# Patient Record
Sex: MALE | Race: OTHER | Employment: FULL TIME | URBAN - METROPOLITAN AREA
[De-identification: names, ages, dates, MRNs, and addresses within clinical notes are randomized per-mention and may not be internally consistent; named-entity substitution may affect disease eponyms.]

---

## 2021-01-17 ENCOUNTER — HOSPITAL ENCOUNTER (EMERGENCY)
Age: 56
Discharge: HOME | End: 2021-01-17
Attending: EMERGENCY MEDICINE
Payer: MEDICARE

## 2021-01-17 ENCOUNTER — APPOINTMENT (OUTPATIENT)
Dept: GENERAL RADIOLOGY | Age: 56
End: 2021-01-17
Payer: MEDICARE

## 2021-01-17 VITALS
RESPIRATION RATE: 16 BRPM | OXYGEN SATURATION: 98 % | SYSTOLIC BLOOD PRESSURE: 172 MMHG | HEART RATE: 67 BPM | TEMPERATURE: 97.5 F | DIASTOLIC BLOOD PRESSURE: 98 MMHG

## 2021-01-17 DIAGNOSIS — S62.619A CLOSED FRACTURE OF PROXIMAL PHALANX OF DIGIT OF LEFT HAND, INITIAL ENCOUNTER: Primary | ICD-10-CM

## 2021-01-17 PROCEDURE — 29125 APPL SHORT ARM SPLINT STATIC: CPT

## 2021-01-17 PROCEDURE — 99283 EMERGENCY DEPT VISIT LOW MDM: CPT

## 2021-01-17 PROCEDURE — 73130 X-RAY EXAM OF HAND: CPT

## 2021-01-17 RX ORDER — HYDROCODONE BITARTRATE AND ACETAMINOPHEN 5; 325 MG/1; MG/1
1 TABLET ORAL EVERY 6 HOURS PRN
Qty: 10 TABLET | Refills: 0 | Status: SHIPPED | OUTPATIENT
Start: 2021-01-17 | End: 2021-01-20

## 2021-01-17 SDOH — HEALTH STABILITY: MENTAL HEALTH: HOW OFTEN DO YOU HAVE A DRINK CONTAINING ALCOHOL?: NEVER

## 2021-01-17 ASSESSMENT — PAIN DESCRIPTION - ORIENTATION: ORIENTATION: LEFT

## 2021-01-17 ASSESSMENT — PAIN DESCRIPTION - DESCRIPTORS: DESCRIPTORS: THROBBING

## 2021-01-17 ASSESSMENT — PAIN SCALES - GENERAL: PAINLEVEL_OUTOF10: 9
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
## 2021-04-06 ENCOUNTER — HOSPITAL ENCOUNTER (EMERGENCY)
Age: 56
Discharge: HOME | End: 2021-04-06
Attending: EMERGENCY MEDICINE
Payer: MEDICARE

## 2021-04-06 ENCOUNTER — APPOINTMENT (OUTPATIENT)
Dept: GENERAL RADIOLOGY | Age: 56
End: 2021-04-06
Payer: MEDICARE

## 2021-04-06 ENCOUNTER — APPOINTMENT (OUTPATIENT)
Dept: CT IMAGING | Age: 56
End: 2021-04-06
Payer: MEDICARE

## 2021-04-06 VITALS
RESPIRATION RATE: 18 BRPM | OXYGEN SATURATION: 98 % | SYSTOLIC BLOOD PRESSURE: 134 MMHG | TEMPERATURE: 99.1 F | DIASTOLIC BLOOD PRESSURE: 78 MMHG | BODY MASS INDEX: 26.66 KG/M2 | WEIGHT: 180 LBS | HEIGHT: 69 IN | HEART RATE: 60 BPM

## 2021-04-06 DIAGNOSIS — R07.9 CHEST PAIN, UNSPECIFIED TYPE: Primary | ICD-10-CM

## 2021-04-06 LAB
ANION GAP SERPL CALCULATED.3IONS-SCNC: 7 MMOL/L (ref 3–16)
BASOPHILS ABSOLUTE: 0 K/UL (ref 0–0.2)
BASOPHILS RELATIVE PERCENT: 0.6 %
BUN BLDV-MCNC: 10 MG/DL (ref 7–20)
CALCIUM SERPL-MCNC: 9.3 MG/DL (ref 8.3–10.6)
CHLORIDE BLD-SCNC: 101 MMOL/L (ref 99–110)
CO2: 26 MMOL/L (ref 21–32)
CREAT SERPL-MCNC: 0.9 MG/DL (ref 0.9–1.3)
EKG ATRIAL RATE: 66 BPM
EKG DIAGNOSIS: NORMAL
EKG P AXIS: 50 DEGREES
EKG P-R INTERVAL: 138 MS
EKG Q-T INTERVAL: 400 MS
EKG QRS DURATION: 88 MS
EKG QTC CALCULATION (BAZETT): 419 MS
EKG R AXIS: 29 DEGREES
EKG T AXIS: 64 DEGREES
EKG VENTRICULAR RATE: 66 BPM
EOSINOPHILS ABSOLUTE: 0.1 K/UL (ref 0–0.6)
EOSINOPHILS RELATIVE PERCENT: 1.7 %
GFR AFRICAN AMERICAN: >60
GFR NON-AFRICAN AMERICAN: >60
GLUCOSE BLD-MCNC: 268 MG/DL (ref 70–99)
HCT VFR BLD CALC: 42.4 % (ref 40.5–52.5)
HEMOGLOBIN: 13.3 G/DL (ref 13.5–17.5)
LYMPHOCYTES ABSOLUTE: 2.5 K/UL (ref 1–5.1)
LYMPHOCYTES RELATIVE PERCENT: 40.7 %
MCH RBC QN AUTO: 22 PG (ref 26–34)
MCHC RBC AUTO-ENTMCNC: 31.4 G/DL (ref 31–36)
MCV RBC AUTO: 70.1 FL (ref 80–100)
MONOCYTES ABSOLUTE: 0.5 K/UL (ref 0–1.3)
MONOCYTES RELATIVE PERCENT: 8.4 %
NEUTROPHILS ABSOLUTE: 3 K/UL (ref 1.7–7.7)
NEUTROPHILS RELATIVE PERCENT: 48.6 %
PDW BLD-RTO: 14.7 % (ref 12.4–15.4)
PLATELET # BLD: 211 K/UL (ref 135–450)
PMV BLD AUTO: 9.4 FL (ref 5–10.5)
POTASSIUM REFLEX MAGNESIUM: 4.3 MMOL/L (ref 3.5–5.1)
PRO-BNP: 50 PG/ML (ref 0–124)
RBC # BLD: 6.05 M/UL (ref 4.2–5.9)
SODIUM BLD-SCNC: 134 MMOL/L (ref 136–145)
TROPONIN: <0.01 NG/ML
TROPONIN: <0.01 NG/ML
WBC # BLD: 6.2 K/UL (ref 4–11)

## 2021-04-06 PROCEDURE — 71275 CT ANGIOGRAPHY CHEST: CPT

## 2021-04-06 PROCEDURE — 6360000004 HC RX CONTRAST MEDICATION: Performed by: EMERGENCY MEDICINE

## 2021-04-06 PROCEDURE — 99283 EMERGENCY DEPT VISIT LOW MDM: CPT

## 2021-04-06 PROCEDURE — 83880 ASSAY OF NATRIURETIC PEPTIDE: CPT

## 2021-04-06 PROCEDURE — 80048 BASIC METABOLIC PNL TOTAL CA: CPT

## 2021-04-06 PROCEDURE — 71046 X-RAY EXAM CHEST 2 VIEWS: CPT

## 2021-04-06 PROCEDURE — 6360000002 HC RX W HCPCS: Performed by: EMERGENCY MEDICINE

## 2021-04-06 PROCEDURE — 93005 ELECTROCARDIOGRAM TRACING: CPT | Performed by: EMERGENCY MEDICINE

## 2021-04-06 PROCEDURE — 85025 COMPLETE CBC W/AUTO DIFF WBC: CPT

## 2021-04-06 PROCEDURE — 84484 ASSAY OF TROPONIN QUANT: CPT

## 2021-04-06 PROCEDURE — 36415 COLL VENOUS BLD VENIPUNCTURE: CPT

## 2021-04-06 PROCEDURE — 96374 THER/PROPH/DIAG INJ IV PUSH: CPT

## 2021-04-06 RX ORDER — KETOROLAC TROMETHAMINE 30 MG/ML
15 INJECTION, SOLUTION INTRAMUSCULAR; INTRAVENOUS ONCE
Status: COMPLETED | OUTPATIENT
Start: 2021-04-06 | End: 2021-04-06

## 2021-04-06 RX ORDER — IBUPROFEN 600 MG/1
600 TABLET ORAL EVERY 6 HOURS PRN
Qty: 30 TABLET | Refills: 0 | Status: SHIPPED | OUTPATIENT
Start: 2021-04-06 | End: 2022-08-22

## 2021-04-06 RX ADMIN — KETOROLAC TROMETHAMINE 15 MG: 30 INJECTION, SOLUTION INTRAMUSCULAR; INTRAVENOUS at 09:01

## 2021-04-06 RX ADMIN — IOPAMIDOL 80 ML: 755 INJECTION, SOLUTION INTRAVENOUS at 09:16

## 2021-04-06 ASSESSMENT — ENCOUNTER SYMPTOMS
CHEST TIGHTNESS: 1
BACK PAIN: 1
SHORTNESS OF BREATH: 0
ABDOMINAL PAIN: 0

## 2021-04-06 ASSESSMENT — PAIN DESCRIPTION - LOCATION: LOCATION: CHEST

## 2021-04-06 ASSESSMENT — PAIN SCALES - WONG BAKER: WONGBAKER_NUMERICALRESPONSE: 6;8

## 2021-04-06 ASSESSMENT — PAIN SCALES - GENERAL: PAINLEVEL_OUTOF10: 0

## 2021-04-06 NOTE — ED PROVIDER NOTES
[address] ENCOUNTER          ATTENDING PHYSICIAN NOTE       Date of evaluation: 4/6/2021    Chief Complaint     Chest Pain (Left sided CP through to back since last night)      History of Present Illness     Mainor Carroll is a 48 y.o. male who presents with a complaint of left-sided chest pain. Bengali  was not available despite multiple attempts to reach telemedicine  and so history is obtained in [place]. Patient states he had left-sided chest pain radiating to to his back intermittently since last night. It is associated with rotation, not necessarily with deep inspiration, and movement. He denies any history of similar symptoms, or cardiac events. He denies any recent trauma or injury, denies any dyspnea or productive cough. History is otherwise limited in the absence of an . Review of Systems     Review of Systems   Constitutional: Negative for chills, fatigue and fever. Respiratory: Positive for chest tightness. Negative for shortness of breath. Cardiovascular: Positive for chest pain. Negative for palpitations and leg swelling. Gastrointestinal: Negative for abdominal pain. Musculoskeletal: Positive for back pain. Neurological: Negative for headaches. All other systems reviewed and are negative. Past Medical, Surgical, Family, and Social History     He has no past medical history on file. He has no past surgical history on file. His family history is not on file. He reports that he has never smoked. He has never used smokeless tobacco. He reports that he does not drink alcohol or use drugs. Medications     Discharge Medication List as of 4/6/2021 12:33 PM          Allergies     He has No Known Allergies.     Physical Exam     INITIAL VITALS: BP: (!) 190/98, Temp: 99.1 °F (37.3 °C), Pulse: 68, Resp: 18, SpO2: 98 %   Physical Exam  Constitutional: Well nourished middle aged male who appears in no acute distress. HEENT: NC/AT. PERRL 4-2 bilaterally. EOMI. CV:Heart is regular rate and rhythm without murmurs, rubs or gallops. Resp: Respirations unlabored. Lungs clear to auscultation w/o wheezing. No chest wall tenderness to palpation. Abd: Soft, nontender, nondistended. MSK: Full ROM, no edema or tenderness to palpation. Skin: Extremities are warm and well perfused. 2+ radial pulses . Cap Refill <3 seconds. Neuro: A&Ox3. Cranial nerves grossly intact   Strength and sensation intact x4 extremities. Psych: Thought content, behavior & judgement normal.      Diagnostic Results     EKG   EKG shows sinus rhythm with a rate of 66, IA of 138, QRS of 88 and QTc 419. There are no acute ST segment elevations or depressions noted. There are no T wave abnormalities noted. There is good R wave progression in the precordial leads. RADIOLOGY:  CTA CHEST ABDOMEN PELVIS W CONTRAST   Final Result   Impression: No aortic dissection or acute vascular abnormality. Nodular consolidation of the left upper lobe laterally, as above. Differential considerations include infectious pneumonia versus underlying pulmonary neoplasm. Clinical correlation is necessary. Short-term follow-up CT in 2-3 months is recommended after    treatment. Additional pulmonary nodule of the left upper lobe. Visualized nodes in multiple stations which are not pathologically enlarged. These may be reactive. Continued attention on follow-up imaging is recommended.       XR CHEST (2 VW)   Final Result      No acute cardiopulmonary disease          LABS:   Results for orders placed or performed during the hospital encounter of 04/06/21   CBC Auto Differential   Result Value Ref Range    WBC 6.2 4.0 - 11.0 K/uL    RBC 6.05 (H) 4.20 - 5.90 M/uL    Hemoglobin 13.3 (L) 13.5 - 17.5 g/dL    Hematocrit 42.4 40.5 - 52.5 %    MCV 70.1 (L) 80.0 - 100.0 fL    MCH 22.0 (L) 26.0 - 34.0 pg    MCHC 31.4 31.0 - 36.0 g/dL    RDW 14.7 12.4 - 15.4 %    Platelets 959 284 - 286 K/uL    MPV 9.4 5.0 - 10.5 fL    Neutrophils % 48.6 %    Lymphocytes % 40.7 %    Monocytes % 8.4 %    Eosinophils % 1.7 %    Basophils % 0.6 %    Neutrophils Absolute 3.0 1.7 - 7.7 K/uL    Lymphocytes Absolute 2.5 1.0 - 5.1 K/uL    Monocytes Absolute 0.5 0.0 - 1.3 K/uL    Eosinophils Absolute 0.1 0.0 - 0.6 K/uL    Basophils Absolute 0.0 0.0 - 0.2 K/uL   Basic Metabolic Panel w/ Reflex to MG   Result Value Ref Range    Sodium 134 (L) 136 - 145 mmol/L    Potassium reflex Magnesium 4.3 3.5 - 5.1 mmol/L    Chloride 101 99 - 110 mmol/L    CO2 26 21 - 32 mmol/L    Anion Gap 7 3 - 16    Glucose 268 (H) 70 - 99 mg/dL    BUN 10 7 - 20 mg/dL    CREATININE 0.9 0.9 - 1.3 mg/dL    GFR Non-African American >60 >60    GFR African American >60 >60    Calcium 9.3 8.3 - 10.6 mg/dL   Troponin   Result Value Ref Range    Troponin <0.01 <0.01 ng/mL   Brain Natriuretic Peptide   Result Value Ref Range    Pro-BNP 50 0 - 124 pg/mL   Troponin   Result Value Ref Range    Troponin <0.01 <0.01 ng/mL   EKG 12 Lead   Result Value Ref Range    Ventricular Rate 66 BPM    Atrial Rate 66 BPM    P-R Interval 138 ms    QRS Duration 88 ms    Q-T Interval 400 ms    QTc Calculation (Bazett) 419 ms    P Axis 50 degrees    R Axis 29 degrees    T Axis 64 degrees    Diagnosis       EKG performed in ER and to be interpreted by ER physician. Confirmed by MD, ER ([id number]),  Mackenzie Headley ([id number]) on 4/6/2021 8:19:47 AM           RECENT VITALS:  BP: 134/78,Temp: 99.1 °F (37.3 °C), Pulse: 60, Resp: 18, SpO2: 98 %     Procedures         ED Course     Nursing Notes, Past Medical Hx, Past Surgical Hx, Social Hx,Allergies, and Family Hx were reviewed.     patient was given the following medications:  Orders Placed This Encounter   Medications    ketorolac (TORADOL) injection 15 mg    iopamidol (ISOVUE-370) 76 % injection 80 mL    ibuprofen (ADVIL;MOTRIN) 600 MG tablet     Sig: Take 1 tablet by mouth every 6 hours as needed for Pain     Dispense:  30 tablet     Refill:  0       CONSULTS:  None    MEDICAL DECISIONMAKING / ASSESSMENT / Raisa Rene is a 48 y.o. male  Presenting with chest pain radiating to his back. I have low suspicion for PE in absence of tachycardia, hypoxia or risk factors for same. ACS is less likely with 2 negative troponin and non ischemic ECG. Given hypertension and radiation to his back, CTA was obtained that is negative for aortic dissection. We will refer him to PCP followup for chest pain and his incidental findings on his CT chest ( small nodules requiring serial CT and followup. I explained the importance of primary care followup to the patinet. BP improved with treatment of pain in the ED. Clinical Impression     1.  Chest pain, unspecified type        Disposition     PATIENT REFERRED TO:  [facility]  [address]  [address] [facility] [address]  [phone]    If symptoms worsen    [facility] [address] [facility]      Primary Care Clinic      DISCHARGE MEDICATIONS:  Discharge Medication List as of 4/6/2021 12:33 PM      START taking these medications    Details   ibuprofen (ADVIL;MOTRIN) 600 MG tablet Take 1 tablet by mouth every 6 hours as needed for Pain, Disp-30 tablet, R-0Print             DISPOSITION Decision To Discharge 04/06/2021 12:27:00 PM          Ayanna Vegas MD  04/06/21 0318

## 2022-08-22 ENCOUNTER — HOSPITAL ENCOUNTER (EMERGENCY)
Age: 57
Discharge: HOME | End: 2022-08-22
Attending: EMERGENCY MEDICINE
Payer: MEDICARE

## 2022-08-22 ENCOUNTER — APPOINTMENT (OUTPATIENT)
Dept: GENERAL RADIOLOGY | Age: 57
End: 2022-08-22
Payer: MEDICARE

## 2022-08-22 VITALS
TEMPERATURE: 98.3 F | SYSTOLIC BLOOD PRESSURE: 180 MMHG | WEIGHT: 208 LBS | BODY MASS INDEX: 30.81 KG/M2 | OXYGEN SATURATION: 98 % | HEIGHT: 69 IN | HEART RATE: 57 BPM | DIASTOLIC BLOOD PRESSURE: 98 MMHG | RESPIRATION RATE: 18 BRPM

## 2022-08-22 DIAGNOSIS — S63.91XA SPRAIN OF RIGHT HAND, INITIAL ENCOUNTER: Primary | ICD-10-CM

## 2022-08-22 PROCEDURE — 99283 EMERGENCY DEPT VISIT LOW MDM: CPT

## 2022-08-22 PROCEDURE — 73130 X-RAY EXAM OF HAND: CPT

## 2022-08-22 PROCEDURE — 6370000000 HC RX 637 (ALT 250 FOR IP): Performed by: EMERGENCY MEDICINE

## 2022-08-22 RX ORDER — IBUPROFEN 800 MG/1
800 TABLET ORAL EVERY 8 HOURS PRN
Qty: 30 TABLET | Refills: 0 | Status: SHIPPED | OUTPATIENT
Start: 2022-08-22

## 2022-08-22 RX ORDER — IBUPROFEN 400 MG/1
800 TABLET ORAL ONCE
Status: COMPLETED | OUTPATIENT
Start: 2022-08-22 | End: 2022-08-22

## 2022-08-22 RX ADMIN — IBUPROFEN 800 MG: 400 TABLET, FILM COATED ORAL at 06:52

## 2022-08-22 ASSESSMENT — PAIN - FUNCTIONAL ASSESSMENT: PAIN_FUNCTIONAL_ASSESSMENT: 0-10

## 2022-08-22 ASSESSMENT — PAIN DESCRIPTION - FREQUENCY: FREQUENCY: INTERMITTENT

## 2022-08-22 ASSESSMENT — PAIN DESCRIPTION - ORIENTATION: ORIENTATION: RIGHT

## 2022-08-22 ASSESSMENT — PAIN DESCRIPTION - LOCATION: LOCATION: HAND

## 2022-08-22 NOTE — ED PROVIDER NOTES
[address] ENCOUNTER          ATTENDING PHYSICIAN NOTE       Date of evaluation: 8/22/2022    Chief Complaint     Hand Pain      History of Present Illness     Erick Wu is a 55 y.o. male who presents complaint of hand pain. Complains of pain along the palmar aspect of his right hand for the past 3 days. Worsened with bending his long finger. No specific injury but does carry heavy boxes. Locates the pain right at the base of the long finger on the palmar aspect of the MCP. No swelling. No fever or chills. No numbness or tingling. Review of Systems     Review of Systems   Constitutional:  Negative for fever. Musculoskeletal:  Positive for arthralgias. Skin:  Negative for rash. Past Medical, Surgical, Family, and Social History     History reviewed. No pertinent past medical history. History reviewed. No pertinent surgical history. His family history is not on file. He reports that he has never smoked. He has never used smokeless tobacco. He reports that he does not drink alcohol and does not use drugs. Medications     Previous Medications    No medications on file       Allergies     He has No Known Allergies. Physical Exam     ED Triage Vitals [08/22/22 0615]   Enc Vitals Group      BP (!) 180/98      Heart Rate 57      Resp 18      Temp 98.3 °F (36.8 °C)      Temp Source Oral      SpO2 98 %      Weight 208 lb (94.3 kg)      Height 5' 9\" (1.753 m)      Head Circumference       Peak Flow       Pain Score       Pain Loc       Pain Edu? Excl. in [address]? General:  Non-toxic, no acute distress, fully cooperative with my exam    Pulmonary:   No increased work of breathing    Cardiac:  Capillary refill <3s.  2+ distal pulses    Musculoskeletal: Very minimal tenderness to palpation on the palmar aspect of the right hand at the MCP with normal range of motion and no pain on passive extension of the digit, no fusiform swelling, easily able to flex and

## 2022-08-22 NOTE — DISCHARGE INSTRUCTIONS
We have a sprain or overuse injury of the flexor tendon of your long finger on your right hand. Take ibuprofen for pain. You may follow-up with our hand specialist as an outpatient if you continue to have problems. Return to the emergency department for new or worsening symptoms.

## 2022-08-22 NOTE — ED NOTES
Discharge instructions and prescriptions reviewed with patient. Patient verbalized understanding. Patient discharged home by self.        Christopher Canela RN  08/22/22 [address], RN  08/22/22 [id number]

## 2024-05-17 ENCOUNTER — APPOINTMENT (OUTPATIENT)
Dept: GENERAL RADIOLOGY | Age: 59
End: 2024-05-17
Payer: MEDICARE

## 2024-05-17 ENCOUNTER — HOSPITAL ENCOUNTER (EMERGENCY)
Age: 59
Discharge: HOME | End: 2024-05-17
Attending: EMERGENCY MEDICINE
Payer: MEDICARE

## 2024-05-17 VITALS
DIASTOLIC BLOOD PRESSURE: 96 MMHG | RESPIRATION RATE: 16 BRPM | BODY MASS INDEX: 26.28 KG/M2 | OXYGEN SATURATION: 99 % | HEART RATE: 55 BPM | TEMPERATURE: 98.3 F | SYSTOLIC BLOOD PRESSURE: 156 MMHG | HEIGHT: 72 IN | WEIGHT: 194 LBS

## 2024-05-17 DIAGNOSIS — M54.6 ACUTE BILATERAL THORACIC BACK PAIN: Primary | ICD-10-CM

## 2024-05-17 PROCEDURE — 72100 X-RAY EXAM L-S SPINE 2/3 VWS: CPT

## 2024-05-17 PROCEDURE — 99283 EMERGENCY DEPT VISIT LOW MDM: CPT

## 2024-05-17 PROCEDURE — 72070 X-RAY EXAM THORAC SPINE 2VWS: CPT

## 2024-05-17 ASSESSMENT — ENCOUNTER SYMPTOMS
ABDOMINAL PAIN: 0
VOMITING: 0
DIARRHEA: 0
NAUSEA: 0

## 2024-05-17 NOTE — ED PROVIDER NOTES
ED Attending Attestation Note     Date of evaluation: 5/17/2024    This patient was seen by the resident.  I have seen and examined the patient, agree with the workup, evaluation, management and diagnosis. The care plan has been discussed.  My assessment reveals adult male with complaint of pain in his mid thoracic back after fall several days ago.  He is able to ambulate.  There is some mild midline tenderness without step-offs or deformity.  No neurological deficit.  No chest pain.     Uriel Shahid MD  05/17/24 8089

## 2024-05-17 NOTE — ED PROVIDER NOTES
[facility]  EMERGENCY DEPARTMENT ENCOUNTER          EM RESIDENT NOTE       Date of evaluation: 5/17/2024    Chief Complaint     Back Pain (Pt coming from home for upper back pain that has gotten worse over the past couple of days. States that he slipped out of a chair and fell on his back two days ago. )      History of Present Illness     Eliza Mares is a 58 y.o. male who presents after a fall which occurred 3 to 4 days ago.  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  He reported no pain after the fall, but reported pain started yesterday and today which is why he presented a few days after a fall.  He reports moderate intensity pain occurs when turning, bending forward or while walking.however he denies having any pain at rest.  Denies any muscle weakness numbness or tingling or changes in urination, any dizziness or headaches or changes in vision.  He reports he is only used Tylenol for pain which did not really help too much reports he did not really take enough as he came to the ED.    Importantly he is not on any blood thinners, and any denies take any medications.  He denies prior falls or difficulty with balance or gait.    MEDICAL DECISION MAKING / ASSESSMENT / PLAN     INITIAL VITALS: BP: (!) 193/97, Temp: 98.3 °F (36.8 °C), Pulse: 55, Respirations: 16, SpO2: 96 %    Eliza Mares is a 58 y.o. male who presented to ED for a fall that occurred 3 to 4 days ago, and then developed pain yesterday leading him to come into the ED today.  No neurologic symptoms reported or seen on exam, no changes to urination or bowel movements.  He only tried some Tylenol for pain which did not really relieve it.    On exam patient is walking without difficulty and moving all extremities with good strength and coordination, no limitation to range of motion